# Patient Record
Sex: MALE | Race: WHITE
[De-identification: names, ages, dates, MRNs, and addresses within clinical notes are randomized per-mention and may not be internally consistent; named-entity substitution may affect disease eponyms.]

---

## 2020-07-16 ENCOUNTER — HOSPITAL ENCOUNTER (OUTPATIENT)
Dept: HOSPITAL 53 - M OPP | Age: 60
Discharge: HOME | End: 2020-07-16
Attending: SURGERY
Payer: COMMERCIAL

## 2020-07-16 VITALS — BODY MASS INDEX: 28.47 KG/M2 | HEIGHT: 75 IN | WEIGHT: 229 LBS

## 2020-07-16 VITALS — DIASTOLIC BLOOD PRESSURE: 112 MMHG | SYSTOLIC BLOOD PRESSURE: 157 MMHG

## 2020-07-16 DIAGNOSIS — Z80.0: ICD-10-CM

## 2020-07-16 DIAGNOSIS — K63.5: ICD-10-CM

## 2020-07-16 DIAGNOSIS — Z12.11: Primary | ICD-10-CM

## 2020-07-16 DIAGNOSIS — I10: ICD-10-CM

## 2020-07-16 DIAGNOSIS — Z79.899: ICD-10-CM

## 2020-07-16 PROCEDURE — 45385 COLONOSCOPY W/LESION REMOVAL: CPT

## 2020-07-16 PROCEDURE — 88305 TISSUE EXAM BY PATHOLOGIST: CPT

## 2020-07-16 NOTE — ROOR
________________________________________________________________________________

Patient Name: Ramin Hall            Procedure Date: 7/16/2020 12:35 PM

MRN: [id number]                          Account Number: [id number]

YOB: 1960               Age: 60

Room: [facility]                            Gender: Male

Note Status: Finalized                 

________________________________________________________________________________

 

Procedure:           Colonoscopy

Indications:         Screening patient at increased risk: Family history of 

                     1st-degree relative with colorectal cancer at age 60 

                     years (or older)

Providers:           Leo J. Gosselin Jr, MD

Referring MD:        Ginny Gonzalez DO

Requesting Provider: 

Medicines:           Propofol per Anesthesia

Complications:       No immediate complications.

________________________________________________________________________________

Procedure:           Pre-Anesthesia Assessment:

                     - Prior to the procedure, a History and Physical was 

                     performed, and patient medications and allergies were 

                     reviewed. The patient is competent. The risks and 

                     benefits of the procedure and the sedation options and 

                     risks were discussed with the patient. All questions were 

                     answered and informed consent was obtained. Patient 

                     identification and proposed procedure were verified by 

                     the physician and the nurse in the pre-procedure area and 

                     in the procedure room. Mental Status Examination: alert 

                     and oriented. Airway Examination: normal oropharyngeal 

                     airway and neck mobility. Respiratory Examination: clear 

                     to auscultation. CV Examination: normal. ASA Grade 

                     Assessment: II - A patient with mild systemic disease. 

                     After reviewing the risks and benefits, the patient was 

                     deemed in satisfactory condition to undergo the 

                     procedure. The anesthesia plan was to use moderate 

                     sedation / analgesia (conscious sedation). Immediately 

                     prior to administration of medications, the patient was 

                     re-assessed for adequacy to receive sedatives. The heart 

                     rate, respiratory rate, oxygen saturations, blood 

                     pressure, adequacy of pulmonary ventilation, and response 

                     to care were monitored throughout the procedure. The 

                     physical status of the patient was re-assessed after the 

                     procedure.

                     The Colonoscope was introduced through the anus and 

                     advanced to the cecum, identified by appendiceal orifice 

                     and ileocecal valve. The colonoscopy was performed 

                     without difficulty. The patient tolerated the procedure 

                     well. The quality of the bowel preparation was adequate.

                                                                                

Findings:

     The rectum, recto-sigmoid colon, sigmoid colon, descending colon, 

     appendiceal orifice and ileocecal valve appeared normal.

     Four polyps were found in the transverse colon, ascending colon and 

     cecum. The polyps were small in size. These polyps were removed with a 

     cold snare. Resection and retrieval were complete.

                                                                                

Impression:          - The rectum, recto-sigmoid colon, sigmoid colon, 

                     descending colon, appendiceal orifice and ileocecal valve 

                     are normal.

                     - Four small polyps in the transverse colon, in the 

                     ascending colon and in the cecum, removed with a cold 

                     snare. Resected and retrieved.

Recommendation:      - Discharge patient to home (ambulatory).

                     - Repeat colonoscopy in 5 years for surveillance.

                                                                                

 

Leo Gosselin MD

_____________________

Leo J Gosselin Jr, MD

7/16/2020 1:11:03 PM

Electronically signed by Leo Gosselin Jr , MD

Number of Addenda: 0

 

Note Initiated On: 7/16/2020 12:35 PM

Estimated Blood Loss:

     Estimated blood loss: none.

## 2023-10-10 ENCOUNTER — HOSPITAL ENCOUNTER (OUTPATIENT)
Dept: HOSPITAL 53 - M PLALAB | Age: 63
End: 2023-10-10
Attending: PHYSICAL MEDICINE & REHABILITATION
Payer: COMMERCIAL

## 2023-10-10 DIAGNOSIS — M25.642: Primary | ICD-10-CM

## 2023-10-10 LAB
BASOPHILS # BLD AUTO: 0 10^3/UL (ref 0–0.2)
BASOPHILS NFR BLD AUTO: 0.5 % (ref 0–1)
CRP SERPL-MCNC: < 0.4 MG/DL (ref ?–1)
EOSINOPHIL # BLD AUTO: 0.2 10^3/UL (ref 0–0.5)
EOSINOPHIL NFR BLD AUTO: 2.7 % (ref 0–3)
ERYTHROCYTE [SEDIMENTATION RATE] IN BLOOD BY WESTERGREN METHOD: 6 MM/HR (ref 0–20)
HCT VFR BLD AUTO: 51.5 % (ref 42–52)
HGB BLD-MCNC: 17 G/DL (ref 13.5–17.5)
LYMPHOCYTES # BLD AUTO: 2.3 10^3/UL (ref 1.5–5)
LYMPHOCYTES NFR BLD AUTO: 30.8 % (ref 24–44)
MCH RBC QN AUTO: 26.4 PG (ref 27–33)
MCHC RBC AUTO-ENTMCNC: 33 G/DL (ref 32–36.5)
MCV RBC AUTO: 79.8 FL (ref 80–96)
MONOCYTES # BLD AUTO: 0.8 10^3/UL (ref 0–0.8)
MONOCYTES NFR BLD AUTO: 10.8 % (ref 2–8)
NEUTROPHILS # BLD AUTO: 4 10^3/UL (ref 1.5–8.5)
NEUTROPHILS NFR BLD AUTO: 55.1 % (ref 36–66)
PLATELET # BLD AUTO: 207 10^3/UL (ref 150–450)
RBC # BLD AUTO: 6.45 10^6/UL (ref 4.3–6.1)
RHEUMATOID FACT SERPL-ACNC: < 3.5 IU/ML (ref ?–14)
WBC # BLD AUTO: 7.3 10^3/UL (ref 4–10)

## 2025-03-03 ENCOUNTER — HOSPITAL ENCOUNTER (OUTPATIENT)
Dept: HOSPITAL 53 - M RAD | Age: 65
End: 2025-03-03
Attending: PHYSICIAN ASSISTANT
Payer: MEDICARE

## 2025-03-03 DIAGNOSIS — N42.89: ICD-10-CM

## 2025-03-03 DIAGNOSIS — R97.20: Primary | ICD-10-CM

## 2025-03-03 PROCEDURE — 72197 MRI PELVIS W/O & W/DYE: CPT

## 2025-04-15 ENCOUNTER — HOSPITAL ENCOUNTER (OUTPATIENT)
Dept: HOSPITAL 53 - M SMT | Age: 65
End: 2025-04-15
Attending: UROLOGY
Payer: COMMERCIAL

## 2025-04-15 DIAGNOSIS — R97.20: Primary | ICD-10-CM
